# Patient Record
Sex: MALE | Race: WHITE | Employment: OTHER | URBAN - METROPOLITAN AREA
[De-identification: names, ages, dates, MRNs, and addresses within clinical notes are randomized per-mention and may not be internally consistent; named-entity substitution may affect disease eponyms.]

---

## 2018-03-02 ENCOUNTER — LAB REQUISITION (OUTPATIENT)
Dept: LAB | Facility: HOSPITAL | Age: 68
End: 2018-03-02
Payer: MEDICARE

## 2018-03-02 DIAGNOSIS — L72.3 SEBACEOUS CYST: ICD-10-CM

## 2018-03-02 PROCEDURE — 88304 TISSUE EXAM BY PATHOLOGIST: CPT

## 2018-03-02 PROCEDURE — 88305 TISSUE EXAM BY PATHOLOGIST: CPT

## 2018-07-25 ENCOUNTER — OFFICE VISIT (OUTPATIENT)
Dept: DERMATOLOGY CLINIC | Facility: CLINIC | Age: 68
End: 2018-07-25
Payer: MEDICARE

## 2018-07-25 DIAGNOSIS — D23.30 BENIGN NEOPLASM OF SKIN OF FACE: ICD-10-CM

## 2018-07-25 DIAGNOSIS — L82.1 SEBORRHEIC KERATOSES: ICD-10-CM

## 2018-07-25 DIAGNOSIS — D23.4 BENIGN NEOPLASM OF SCALP AND SKIN OF NECK: ICD-10-CM

## 2018-07-25 DIAGNOSIS — D23.70 BENIGN NEOPLASM OF SKIN OF LOWER LIMB, INCLUDING HIP, UNSPECIFIED LATERALITY: ICD-10-CM

## 2018-07-25 DIAGNOSIS — D23.5 BENIGN NEOPLASM OF SKIN OF TRUNK, EXCEPT SCROTUM: ICD-10-CM

## 2018-07-25 DIAGNOSIS — D23.60 BENIGN NEOPLASM OF SKIN OF UPPER LIMB, INCLUDING SHOULDER, UNSPECIFIED LATERALITY: ICD-10-CM

## 2018-07-25 DIAGNOSIS — L57.0 AK (ACTINIC KERATOSIS): Primary | ICD-10-CM

## 2018-07-25 PROCEDURE — 17003 DESTRUCT PREMALG LES 2-14: CPT | Performed by: DERMATOLOGY

## 2018-07-25 PROCEDURE — 99213 OFFICE O/P EST LOW 20 MIN: CPT | Performed by: DERMATOLOGY

## 2018-07-25 PROCEDURE — 17000 DESTRUCT PREMALG LESION: CPT | Performed by: DERMATOLOGY

## 2018-07-25 RX ORDER — METOPROLOL SUCCINATE 25 MG/1
12.5 TABLET, EXTENDED RELEASE ORAL
COMMUNITY
Start: 2017-10-03

## 2018-07-25 RX ORDER — VALSARTAN 40 MG/1
TABLET ORAL
COMMUNITY
Start: 2018-06-19 | End: 2018-07-25

## 2018-07-25 RX ORDER — BIOTIN 1 MG
1000 TABLET ORAL
COMMUNITY

## 2018-07-25 RX ORDER — VENLAFAXINE HYDROCHLORIDE 75 MG/1
CAPSULE, EXTENDED RELEASE ORAL
COMMUNITY
Start: 2018-06-19 | End: 2018-07-25

## 2018-08-06 NOTE — PROGRESS NOTES
Parker Culp is a 76year old male. HPI:     CC:  Patient presents with:  Lesion: LOV 10/7/2016. Pt presenting with leisons to scalp, face and bilateral arms. c/o scoreness and dryness to scalp and L cheek.  Pt has a personal hx of AKs that were previous Take 40 mg by mouth daily. Disp:  Rfl:    Omega-3 1000 MG Oral Cap Take  by mouth. Disp:  Rfl:    Multiple Vitamins-Minerals (CENTRUM SILVER ULTRA MENS) Oral Tab Take  by mouth.  Disp:  Rfl:    Venlafaxine HCl (EFFEXOR) 75 MG Oral Tab Take 75 mg by mouth 2 personal hx of AKs that were previously treated with cryotherapy. Pt has family hx of unknown type of skin cacner. Patient presents with concerns above. Patient has been in their usual state of health.   History, medications, allergies reviewed as keratoses    Actinic keratoses. Precancerous nature discussed. Lesions treated with cryo- . Biopsy if not resolved. lesions over the scalp right medial cheek, left lower cheek, right lower cheek, anterior scalp scattered small lesions over the scalp.

## 2019-02-02 ENCOUNTER — HOSPITAL ENCOUNTER (EMERGENCY)
Facility: HOSPITAL | Age: 69
Discharge: HOME | End: 2019-02-02
Attending: EMERGENCY MEDICINE
Payer: MEDICARE

## 2019-02-02 VITALS
TEMPERATURE: 98 F | HEIGHT: 71 IN | SYSTOLIC BLOOD PRESSURE: 159 MMHG | HEART RATE: 94 BPM | RESPIRATION RATE: 18 BRPM | DIASTOLIC BLOOD PRESSURE: 79 MMHG | OXYGEN SATURATION: 99 % | WEIGHT: 185 LBS | BODY MASS INDEX: 25.9 KG/M2

## 2019-02-02 DIAGNOSIS — S50.851A: Primary | ICD-10-CM

## 2019-02-02 PROCEDURE — 99283 EMERGENCY DEPT VISIT LOW MDM: CPT

## 2019-02-02 PROCEDURE — 10120 INC&RMVL FB SUBQ TISS SMPL: CPT

## 2019-02-02 RX ORDER — CEPHALEXIN 500 MG/1
500 CAPSULE ORAL 3 TIMES DAILY
Qty: 15 CAPSULE | Refills: 0 | Status: SHIPPED | OUTPATIENT
Start: 2019-02-02 | End: 2022-01-20 | Stop reason: ALTCHOICE

## 2019-02-03 NOTE — ED PROVIDER NOTES
Patient Seen in: [facility] AND [facility] Emergency Department    History   Patient presents with:  Foreign Body    Stated Complaint: \"1/2 inch sliver in right forearm\"    HPI    he presents because of foreign body to the right forearm.   He was working with h Social History    Tobacco Use      Smoking status: Former Smoker      Smokeless tobacco: Never Used    Alcohol use:  Yes      Alcohol/week: 1.2 oz      Types: 2 Cans of beer per week      Comment: socially    Drug use: No      Review of Systems  Const approximately 1 inch wooden splinter. No residual foreign body was felt or seen. He had bacitracin and bandage applied I discussed signs of infection I recommended follow-up with his doctor he states his wife is a nurse and can take good care of it.     E

## 2019-07-12 ENCOUNTER — OFFICE VISIT (OUTPATIENT)
Dept: DERMATOLOGY CLINIC | Facility: CLINIC | Age: 69
End: 2019-07-12
Payer: MEDICARE

## 2019-07-12 DIAGNOSIS — L82.1 SEBORRHEIC KERATOSES: ICD-10-CM

## 2019-07-12 DIAGNOSIS — D23.30 BENIGN NEOPLASM OF SKIN OF FACE: ICD-10-CM

## 2019-07-12 DIAGNOSIS — L57.0 AK (ACTINIC KERATOSIS): Primary | ICD-10-CM

## 2019-07-12 DIAGNOSIS — D23.60 BENIGN NEOPLASM OF SKIN OF UPPER LIMB, INCLUDING SHOULDER, UNSPECIFIED LATERALITY: ICD-10-CM

## 2019-07-12 DIAGNOSIS — D23.4 BENIGN NEOPLASM OF SCALP AND SKIN OF NECK: ICD-10-CM

## 2019-07-12 DIAGNOSIS — D23.70 BENIGN NEOPLASM OF SKIN OF LOWER LIMB, INCLUDING HIP, UNSPECIFIED LATERALITY: ICD-10-CM

## 2019-07-12 DIAGNOSIS — D23.5 BENIGN NEOPLASM OF SKIN OF TRUNK, EXCEPT SCROTUM: ICD-10-CM

## 2019-07-12 PROCEDURE — 99213 OFFICE O/P EST LOW 20 MIN: CPT | Performed by: DERMATOLOGY

## 2019-07-12 PROCEDURE — 17000 DESTRUCT PREMALG LESION: CPT | Performed by: DERMATOLOGY

## 2019-07-12 PROCEDURE — 17003 DESTRUCT PREMALG LES 2-14: CPT | Performed by: DERMATOLOGY

## 2019-07-12 PROCEDURE — G0463 HOSPITAL OUTPT CLINIC VISIT: HCPCS | Performed by: DERMATOLOGY

## 2019-07-12 RX ORDER — LOSARTAN POTASSIUM 25 MG/1
25 TABLET ORAL
COMMUNITY
Start: 2018-10-08

## 2019-07-22 NOTE — PROGRESS NOTES
Cora Cr is a 71year old male. HPI:     CC:  Patient presents with:  Actinic Keratosis: LOV 7/25/18. pt presenting today with f/u. pt concerned about new spots to bilateral cheeks. Denies personal HX of skin cancer.         Allergies:  Patient has n 100 MG Oral Cap Take  by mouth. Disp:  Rfl:    valsartan (DIOVAN) 80 MG Oral Tab Take 40 mg by mouth daily. Disp:  Rfl:    Omega-3 1000 MG Oral Cap Take  by mouth.  Disp:  Rfl:    Multiple Vitamins-Minerals (CENTRUM SILVER ULTRA MENS) Oral Tab Take  by mout together: Not on file        Attends Evangelical service: Not on file        Active member of club or organization: Not on file        Attends meetings of clubs or organizations: Not on file        Relationship status: Not on file      Intimate partner viole neck, face,nails, hair, external eyes, including conjunctival mucosa, eyelids, lips external ears, back, chest,/ breasts, axillae,  abdomen, arms, legs, palms.      Multiple light to medium brown, well marginated, uniformly pigmented, macules and papules 6 therapies, risks benefits discussed. Pathophysiology discussed with patient. Therapeutic options reviewed. See  Medications in grid. Instructions reviewed at length.     Benign nevi, seborrheic  keratoses, cherry angiomas:  Reassurance regarding other roverto

## 2020-08-07 ENCOUNTER — OFFICE VISIT (OUTPATIENT)
Dept: DERMATOLOGY CLINIC | Facility: CLINIC | Age: 70
End: 2020-08-07
Payer: MEDICARE

## 2020-08-07 DIAGNOSIS — D23.70 BENIGN NEOPLASM OF SKIN OF LOWER LIMB, INCLUDING HIP, UNSPECIFIED LATERALITY: ICD-10-CM

## 2020-08-07 DIAGNOSIS — D23.30 BENIGN NEOPLASM OF SKIN OF FACE: ICD-10-CM

## 2020-08-07 DIAGNOSIS — D23.4 BENIGN NEOPLASM OF SCALP AND SKIN OF NECK: ICD-10-CM

## 2020-08-07 DIAGNOSIS — D23.5 BENIGN NEOPLASM OF SKIN OF TRUNK, EXCEPT SCROTUM: ICD-10-CM

## 2020-08-07 DIAGNOSIS — L57.0 AK (ACTINIC KERATOSIS): Primary | ICD-10-CM

## 2020-08-07 DIAGNOSIS — L82.1 SEBORRHEIC KERATOSES: ICD-10-CM

## 2020-08-07 DIAGNOSIS — D23.60 BENIGN NEOPLASM OF SKIN OF UPPER LIMB, INCLUDING SHOULDER, UNSPECIFIED LATERALITY: ICD-10-CM

## 2020-08-07 PROCEDURE — 99213 OFFICE O/P EST LOW 20 MIN: CPT | Performed by: DERMATOLOGY

## 2020-08-07 PROCEDURE — 17003 DESTRUCT PREMALG LES 2-14: CPT | Performed by: DERMATOLOGY

## 2020-08-07 PROCEDURE — 17000 DESTRUCT PREMALG LESION: CPT | Performed by: DERMATOLOGY

## 2020-08-07 PROCEDURE — G0463 HOSPITAL OUTPT CLINIC VISIT: HCPCS | Performed by: DERMATOLOGY

## 2020-08-16 NOTE — PROGRESS NOTES
Nathan Ruiz is a 79year old male.   HPI:     CC:  Patient presents with:  Derm Problem: LOV 7/12/19, spot on left clavicle that is small, dark in color, right cheek redness has had for the past 2 weeks, nose right side dry area has had for the past 3 mo daily.     • Omega-3 1000 MG Oral Cap Take  by mouth. • Multiple Vitamins-Minerals (CENTRUM SILVER ULTRA MENS) Oral Tab Take  by mouth. • Venlafaxine HCl (EFFEXOR) 75 MG Oral Tab Take 75 mg by mouth 2 (two) times daily.      • cephALEXin 500 MG Oral connections:        Talks on phone: Not on file        Gets together: Not on file        Attends Hoahaoism service: Not on file        Active member of club or organization: Not on file        Attends meetings of clubs or organizations: Not on file noted.       Physical Examination:     Well-developed well-nourished patient alert oriented in no acute distress.   Exam total-body performed, including scalp, head, neck, face,nails, hair, external eyes, including conjunctival mucosa, eyelids, lips externa pruritus. Continue with moisturizers, sun protection    Scattered waxy tan keratotic papules reassurance very benign keratoses no other suspicious lesions noted      please refer to map for specific lesions. See additional diagnoses.   Pros cons of variou

## 2020-12-22 ENCOUNTER — IMMUNIZATION (OUTPATIENT)
Dept: LAB | Facility: HOSPITAL | Age: 70
End: 2020-12-22
Attending: PREVENTIVE MEDICINE
Payer: MEDICARE

## 2020-12-22 DIAGNOSIS — Z23 NEED FOR VACCINATION: ICD-10-CM

## 2020-12-22 PROCEDURE — 0001A SARSCOV2 VAC 30MCG/0.3ML IM: CPT

## 2021-01-12 ENCOUNTER — IMMUNIZATION (OUTPATIENT)
Dept: LAB | Facility: HOSPITAL | Age: 71
End: 2021-01-12
Attending: PREVENTIVE MEDICINE
Payer: MEDICARE

## 2021-01-12 DIAGNOSIS — Z23 NEED FOR VACCINATION: Primary | ICD-10-CM

## 2021-01-12 PROCEDURE — 0002A SARSCOV2 VAC 30MCG/0.3ML IM: CPT

## 2021-10-04 ENCOUNTER — IMMUNIZATION (OUTPATIENT)
Dept: LAB | Facility: HOSPITAL | Age: 71
End: 2021-10-04
Attending: EMERGENCY MEDICINE
Payer: MEDICARE

## 2021-10-04 ENCOUNTER — IMMUNIZATION (OUTPATIENT)
Dept: FAMILY MEDICINE CLINIC | Facility: CLINIC | Age: 71
End: 2021-10-04
Payer: MEDICARE

## 2021-10-04 DIAGNOSIS — Z23 INFLUENZA VACCINATION GIVEN: Primary | ICD-10-CM

## 2021-10-04 DIAGNOSIS — Z23 NEED FOR VACCINATION: Primary | ICD-10-CM

## 2021-10-04 PROCEDURE — 0003A SARSCOV2 VAC 30MCG/0.3ML IM: CPT

## 2021-10-04 PROCEDURE — G0008 ADMIN INFLUENZA VIRUS VAC: HCPCS | Performed by: PHYSICIAN ASSISTANT

## 2021-10-04 PROCEDURE — 90662 IIV NO PRSV INCREASED AG IM: CPT | Performed by: PHYSICIAN ASSISTANT

## 2022-01-14 ENCOUNTER — OFFICE VISIT (OUTPATIENT)
Dept: DERMATOLOGY CLINIC | Facility: CLINIC | Age: 72
End: 2022-01-14
Payer: MEDICARE

## 2022-01-14 DIAGNOSIS — L82.1 SEBORRHEIC KERATOSES: ICD-10-CM

## 2022-01-14 DIAGNOSIS — D23.30 BENIGN NEOPLASM OF SKIN OF FACE: ICD-10-CM

## 2022-01-14 DIAGNOSIS — L81.4 LENTIGO: ICD-10-CM

## 2022-01-14 DIAGNOSIS — L57.0 AK (ACTINIC KERATOSIS): Primary | ICD-10-CM

## 2022-01-14 DIAGNOSIS — D23.4 BENIGN NEOPLASM OF SCALP AND SKIN OF NECK: ICD-10-CM

## 2022-01-14 DIAGNOSIS — D23.60 BENIGN NEOPLASM OF SKIN OF UPPER LIMB, INCLUDING SHOULDER, UNSPECIFIED LATERALITY: ICD-10-CM

## 2022-01-14 DIAGNOSIS — D23.5 BENIGN NEOPLASM OF SKIN OF TRUNK, EXCEPT SCROTUM: ICD-10-CM

## 2022-01-14 PROCEDURE — 17003 DESTRUCT PREMALG LES 2-14: CPT | Performed by: DERMATOLOGY

## 2022-01-14 PROCEDURE — 17000 DESTRUCT PREMALG LESION: CPT | Performed by: DERMATOLOGY

## 2022-01-14 PROCEDURE — 99213 OFFICE O/P EST LOW 20 MIN: CPT | Performed by: DERMATOLOGY

## 2022-01-14 RX ORDER — VENLAFAXINE HYDROCHLORIDE 75 MG/1
CAPSULE, EXTENDED RELEASE ORAL
COMMUNITY
Start: 2021-11-19

## 2022-01-14 RX ORDER — FLUOROURACIL 50 MG/G
CREAM TOPICAL
Qty: 40 G | Refills: 1 | Status: SHIPPED | OUTPATIENT
Start: 2022-01-14

## 2022-01-15 NOTE — PROGRESS NOTES
Zoe Ellis is a 70year old male. HPI:     CC:  Patient presents with:  Upper Body Exam: LOV 8/7/20-Pt. with Hx of AK's. Pt. present for Upper Body Skin Exam. Pt. present with c/o for dry and flakey scalp, which he admits to picking at.  Redness on R c (OMEGA-3 DHA) Does not apply Powder Take by mouth. • Atorvastatin Calcium 10 MG Oral Tab      • Glucosamine Sulfate 1000 MG Oral Tab Take  by mouth. • Coenzyme Q10 100 MG Oral Cap Take  by mouth.      • valsartan (DIOVAN) 80 MG Oral Tab Take 40 mg b Other Topics      Concerns:        Grew up on a farm: Not Asked        History of tanning: Not Asked        Outdoor occupation: Not Asked        Pt has a pacemaker: No        Pt has a defibrillator: No        Reaction to local anesthetic: No    Social Hist History, medications, allergies reviewed as noted. Physical Examination:     Well-developed well-nourished patient alert oriented in no acute distress.   Exam total-body performed, including scalp, head, neck, face,nails, hair, external eyes, includin crusting irritation pain tenderness potential discussed. Anticipate one month for resolution of symptoms. Plan recheck in one month after completion of treatment course. Consider alternative treatment biopsy if not resolved.   Continue careful sun protec

## 2022-01-19 ENCOUNTER — TELEMEDICINE (OUTPATIENT)
Dept: TELEHEALTH | Age: 72
End: 2022-01-19

## 2022-01-19 DIAGNOSIS — Z02.9 ENCOUNTER FOR ADMINISTRATIVE EXAMINATIONS, UNSPECIFIED: Primary | ICD-10-CM

## 2022-01-19 NOTE — PROGRESS NOTES
Video visit created in error. Gave patient number or central scheduling. Pt denies any ear pain or ear infection. No charge.

## 2022-01-20 ENCOUNTER — OFFICE VISIT (OUTPATIENT)
Dept: AUDIOLOGY | Facility: CLINIC | Age: 72
End: 2022-01-20
Payer: MEDICARE

## 2022-01-20 ENCOUNTER — OFFICE VISIT (OUTPATIENT)
Dept: OTOLARYNGOLOGY | Facility: CLINIC | Age: 72
End: 2022-01-20
Payer: MEDICARE

## 2022-01-20 VITALS — BODY MASS INDEX: 25.9 KG/M2 | HEIGHT: 71 IN | RESPIRATION RATE: 16 BRPM | WEIGHT: 185 LBS

## 2022-01-20 DIAGNOSIS — H83.3X3 NOISE-INDUCED HEARING LOSS OF BOTH EARS: Primary | ICD-10-CM

## 2022-01-20 DIAGNOSIS — H90.3 SENSORINEURAL HEARING LOSS, BILATERAL: Primary | ICD-10-CM

## 2022-01-20 PROCEDURE — 92567 TYMPANOMETRY: CPT | Performed by: AUDIOLOGIST

## 2022-01-20 PROCEDURE — 99203 OFFICE O/P NEW LOW 30 MIN: CPT | Performed by: OTOLARYNGOLOGY

## 2022-01-20 PROCEDURE — 92557 COMPREHENSIVE HEARING TEST: CPT | Performed by: AUDIOLOGIST

## 2022-01-20 NOTE — PROGRESS NOTES
Carmelina Nava is a 70year old male. Patient presents with:  Establish Care: Patient is hearing loss over the years.  Patient is Former  and He thinks it contributes to hearing loss         HISTORY OF PRESENT ILLNESS  1/20/2022   Here for evaluation Date   • Arthritis    • Essential hypertension    • High cholesterol    • Hyperlipidemia    • Sleep apnea      Past Surgical History:   Procedure Laterality Date   • COLONOSCOPY     • HERNIA SURGERY      left inguinal hernia repair x2   • INGUINAL HERNIA R loss consistent with his noise exposure          Current Outpatient Medications:   •  venlafaxine 75 MG Oral Capsule SR 24 Hr, , Disp: , Rfl:   •  fluorouracil 5 % External Cream, Use twice weekly at night to scalp and right cheek and ears as directed x 6 dictation, discrepancies may still exist  Timothy Murray MD 1/20/2022 12:17 PM

## 2022-01-20 NOTE — PATIENT INSTRUCTIONS
How Hearing Aids Can Help You    Losing your hearing can be frustrating. But hearing aids can help you hear what [place] been missing. Not everyone who has hearing loss needs hearing aids.  Hearing aids will most likely help you if your hearing loss:   · The [facility] 4037. All rights reserved. This information is not intended as a substitute for professional medical care. Always follow your healthcare professional's instructions.

## 2022-05-25 ENCOUNTER — TELEPHONE (OUTPATIENT)
Dept: DERMATOLOGY CLINIC | Facility: CLINIC | Age: 72
End: 2022-05-25

## 2022-05-25 NOTE — TELEPHONE ENCOUNTER
Patient would like if possible provider could see him  for his head treatment for follow up.  Patient would like to know if possible could come with wife has appointment on 6/29/22 at 6:45pm.

## 2023-03-14 ENCOUNTER — OFFICE VISIT (OUTPATIENT)
Dept: AUDIOLOGY | Facility: CLINIC | Age: 73
End: 2023-03-14

## 2023-03-14 ENCOUNTER — OFFICE VISIT (OUTPATIENT)
Dept: OTOLARYNGOLOGY | Facility: CLINIC | Age: 73
End: 2023-03-14

## 2023-03-14 VITALS — TEMPERATURE: 99 F

## 2023-03-14 DIAGNOSIS — H91.8X9 ASYMMETRICAL HEARING LOSS: ICD-10-CM

## 2023-03-14 DIAGNOSIS — H83.3X3 NOISE-INDUCED HEARING LOSS OF BOTH EARS: Primary | ICD-10-CM

## 2023-03-14 DIAGNOSIS — H90.3 SENSORINEURAL HEARING LOSS, BILATERAL: Primary | ICD-10-CM

## 2023-03-14 PROCEDURE — 92567 TYMPANOMETRY: CPT | Performed by: AUDIOLOGIST

## 2023-03-14 PROCEDURE — 92557 COMPREHENSIVE HEARING TEST: CPT | Performed by: AUDIOLOGIST

## 2023-03-14 PROCEDURE — 99213 OFFICE O/P EST LOW 20 MIN: CPT | Performed by: OTOLARYNGOLOGY

## 2023-10-12 ENCOUNTER — OFFICE VISIT (OUTPATIENT)
Dept: DERMATOLOGY CLINIC | Facility: CLINIC | Age: 73
End: 2023-10-12

## 2023-10-12 DIAGNOSIS — D23.4 BENIGN NEOPLASM OF SCALP AND SKIN OF NECK: ICD-10-CM

## 2023-10-12 DIAGNOSIS — D23.30 BENIGN NEOPLASM OF SKIN OF FACE: ICD-10-CM

## 2023-10-12 DIAGNOSIS — D22.9 MULTIPLE NEVI: ICD-10-CM

## 2023-10-12 DIAGNOSIS — L57.0 AK (ACTINIC KERATOSIS): Primary | ICD-10-CM

## 2023-10-12 DIAGNOSIS — L82.1 SEBORRHEIC KERATOSES: ICD-10-CM

## 2023-10-12 DIAGNOSIS — L81.4 LENTIGO: ICD-10-CM

## 2023-10-12 DIAGNOSIS — D23.60 BENIGN NEOPLASM OF SKIN OF UPPER LIMB, INCLUDING SHOULDER, UNSPECIFIED LATERALITY: ICD-10-CM

## 2023-10-12 PROCEDURE — 17003 DESTRUCT PREMALG LES 2-14: CPT | Performed by: DERMATOLOGY

## 2023-10-12 PROCEDURE — 99213 OFFICE O/P EST LOW 20 MIN: CPT | Performed by: DERMATOLOGY

## 2023-10-12 PROCEDURE — 17000 DESTRUCT PREMALG LESION: CPT | Performed by: DERMATOLOGY

## 2023-10-23 NOTE — PROGRESS NOTES
Jeanette Wong is a 68year old male. HPI:     CC:  Patient presents with:  Actinic Keratosis: LOV 1/14/22. Patient present for recurring spot on his nose. Tried applying fluorouracil 5% cream with minimal improvement. Patient also applied moisturizer to keep the lesion soft. Denies itching. Denies pain. Notes, the lesions on his scalp are gone. Allergies:  Patient has no known allergies. HISTORY:    Past Medical History:   Diagnosis Date    Arthritis     Essential hypertension     High cholesterol     Hyperlipidemia     Sleep apnea       Past Surgical History:   Procedure Laterality Date    COLONOSCOPY      HERNIA SURGERY      left inguinal hernia repair x2    INGUINAL HERNIA REPAIR Right 04/12/16    With insertion of an extra large PreFix Marlex plug and mesh and excision of lipoma of the cord. OTHER SURGICAL HISTORY Right     HAND SURGERY    TONSILLECTOMY      UMBILICAL HERNIA REPAIR  04/12/16    with mesh      Family History   Problem Relation Age of Onset    Cancer Father         Skin    Prostate Cancer Father     Cancer Mother         Breast    Breast Cancer Mother       Social History     Socioeconomic History    Marital status:     Number of children: 3   Occupational History    Occupation: retired   Tobacco Use    Smoking status: Former    Smokeless tobacco: Never   Substance and Sexual Activity    Alcohol use: Yes     Alcohol/week: 2.0 standard drinks of alcohol     Types: 2 Cans of beer per week     Comment: socially    Drug use: No   Other Topics Concern    Pt has a pacemaker No    Pt has a defibrillator No    Reaction to local anesthetic No        Current Outpatient Medications   Medication Sig Dispense Refill    venlafaxine 75 MG Oral Capsule SR 24 Hr       fluorouracil 5 % External Cream Use twice weekly at night to scalp and right cheek and ears as directed x 6 weeks 40 g 1    Losartan Potassium 25 MG Oral Tab Take 1 tablet (25 mg total) by mouth.       CALCIUM OR 1 tab daily Cholecalciferol (VITAMIN D3) 1000 units Oral Cap Take 1,000 Units by mouth. Metoprolol Succinate ER 25 MG Oral Tablet 24 Hr Take 0.5 tablets (12.5 mg total) by mouth. Docosahexaenoic Acid (OMEGA-3 DHA) Does not apply Powder Take by mouth. Atorvastatin Calcium 10 MG Oral Tab       aspirin 81 MG Oral Tab Take by mouth. Glucosamine Sulfate 1000 MG Oral Tab Take  by mouth. Coenzyme Q10 100 MG Oral Cap Take  by mouth.      valsartan (DIOVAN) 80 MG Oral Tab Take 0.5 tablets (40 mg total) by mouth daily. Omega-3 1000 MG Oral Cap Take  by mouth. Multiple Vitamins-Minerals (CENTRUM SILVER ULTRA MENS) Oral Tab Take by mouth. Venlafaxine HCl (EFFEXOR) 75 MG Oral Tab Take 1 tablet (75 mg total) by mouth 2 (two) times daily. Allergies:   No Known Allergies    Past Medical History:   Diagnosis Date    Arthritis     Essential hypertension     High cholesterol     Hyperlipidemia     Sleep apnea      Past Surgical History:   Procedure Laterality Date    COLONOSCOPY      HERNIA SURGERY      left inguinal hernia repair x2    INGUINAL HERNIA REPAIR Right 04/12/16    With insertion of an extra large PreFix Marlex plug and mesh and excision of lipoma of the cord. OTHER SURGICAL HISTORY Right     HAND SURGERY    TONSILLECTOMY      UMBILICAL HERNIA REPAIR  04/12/16    with mesh     Social History    Socioeconomic History      Marital status:       Spouse name: Not on file      Number of children: 3      Years of education: Not on file      Highest education level: Not on file    Occupational History      Occupation: retired    Tobacco Use      Smoking status: Former      Smokeless tobacco: Never    Substance and Sexual Activity      Alcohol use:  Yes        Alcohol/week: 2.0 standard drinks of alcohol        Types: 2 Cans of beer per week        Comment: socially      Drug use: No      Sexual activity: Not on file    Other Topics      Concerns:        Grew up on a farm: Not Asked History of tanning: Not Asked        Outdoor occupation: Not Asked        Pt has a pacemaker: No        Pt has a defibrillator: No        Reaction to local anesthetic: No    Social History Narrative      Not on file    Social Determinants of Health  Financial Resource Strain: Not on file  Food Insecurity: Not on file  Transportation Needs: Not on file  Physical Activity: Not on file  Stress: Not on file  Social Connections: Not on file  Housing Stability: Not on file  Family History   Problem Relation Age of Onset    Cancer Father         Skin    Prostate Cancer Father     Cancer Mother         Breast    Breast Cancer Mother        There were no vitals filed for this visit. HPI:    Patient presents with:  Actinic Keratosis: LOV 1/14/22. Patient present for recurring spot on his nose. Tried applying fluorouracil 5% cream with minimal improvement. Patient also applied moisturizer to keep the lesion soft. Denies itching. Denies pain. Notes, the lesions on his scalp are gone. Past notes/ records and appropriate/relevant lab results including pathology and past body maps reviewed. Updated and new information noted in current visit. History of AK's follow-up. Patient has been more careful use sunscreen and hat. Still outdoors a great deal     concerned lesions on scalp, right mid cheek left jawline  Left chest lesion, left flank lesion      Patient presents with concerns above. Patient has been in their usual state of health. History, medications, allergies reviewed as noted. ROS:  Denies any other systemic complaints. No new or changeing lesions other than noted above. No fevers, chills, night sweats, unusual sun sensitivity. No other skin complaints. History, medications, allergies reviewed as noted. Physical Examination:     Well-developed well-nourished patient alert oriented in no acute distress.   Exam total-body performed, including scalp, head, neck, face,nails, hair, external eyes, including conjunctival mucosa, eyelids, lips external ears, back, chest,/ breasts, axillae,  abdomen, arms, legs, palms. Multiple light to medium brown, well marginated, uniformly pigmented, macules and papules 6 mm and less scattered on exam. pigmented lesions examined with dermoscopy benign-appearing patterns. Waxy tannish keratotic papules scattered, cherry-red vascular papules scattered. See map today's date for lesions noted . Erythematous scaling keratotic papules see below     otherwise remarkable for lesions as noted on map. See Assessment /Plan for additional history and physical exam also:    Assessment / plan:    No orders of the defined types were placed in this encounter. Meds & Refills for this Visit:  Requested Prescriptions      No prescriptions requested or ordered in this encounter         Ak (actinic keratosis)  (primary encounter diagnosis)  Seborrheic keratoses  Lentigo  Multiple nevi  Benign neoplasm of skin of upper limb, including shoulder, unspecified laterality  Benign neoplasm of scalp and skin of neck  Benign neoplasm of skin of face    Lesion on nasal dorsum AK continue fluorouracil    Erythematous scaling keratotic papules noted at sites noted on map  Actinic Keratoses. Precancerous nature discussed. Sun protection, sunscreen/ blocks encouraged Lesions treated with cryo- . Biopsy if not resolved. Nasal dorsum treat with cryo we will follow-up with fluorouracil as above, lesions at vertex, left helical rim, right temple X3    Multiple benign keratoses in the background upper lip, right cheek scattered lesions on the chest  Waxy tan keratotic papules lesions in areas of concern as noted reassurance given. Benign nature discussed. Possibility of cryo, alphahydroxy acids over-the-counter retinol's discussed.   Continue careful sun protection  History of verrucoid keratoses stable    Area of chronic pruritus burning better presently stable last year had been more inflamedpossible brachial radial pruritus. Continue with moisturizers, sun protection    Scattered waxy tan keratotic papules reassurance very benign keratoses no other suspicious lesions noted      please refer to map for specific lesions. See additional diagnoses. Pros cons of various therapies, risks benefits discussed. Pathophysiology discussed with patient. Therapeutic options reviewed. See  Medications in grid. Instructions reviewed at length. Benign nevi, seborrheic  keratoses, cherry angiomas:  Reassurance regarding other benign skin lesions. Signs and symptoms of skin cancer, ABCDE's of melanoma discussed with patient. Sunscreen use, sun protection, self exams reviewed. Sunscreen sun protective clothing discussed. At least SPF 48, hat followup as noted RTC routine checkup 6 mos - one year or p.r.n. The patient indicates understanding of these issues and agrees to the plan. The patient is asked to return as noted in follow-up/ above. This note was generated using Dragon voice recognition software. Please contact me regarding any confusion resulting from errors in recognition.

## 2024-05-04 ENCOUNTER — HOSPITAL ENCOUNTER (OUTPATIENT)
Dept: ULTRASOUND IMAGING | Facility: HOSPITAL | Age: 74
Discharge: HOME | End: 2024-05-04
Attending: INTERNAL MEDICINE
Payer: MEDICARE

## 2024-05-04 DIAGNOSIS — Z13.6 SCREENING FOR CARDIOVASCULAR CONDITION: ICD-10-CM

## 2024-05-04 PROCEDURE — 76706 US ABDL AORTA SCREEN AAA: CPT | Performed by: INTERNAL MEDICINE

## 2024-05-29 ENCOUNTER — OFFICE VISIT (OUTPATIENT)
Dept: DERMATOLOGY CLINIC | Facility: CLINIC | Age: 74
End: 2024-05-29
Payer: MEDICARE

## 2024-05-29 DIAGNOSIS — L30.8 PSORIASIFORM DERMATITIS: ICD-10-CM

## 2024-05-29 DIAGNOSIS — L57.0 AK (ACTINIC KERATOSIS): Primary | ICD-10-CM

## 2024-05-29 DIAGNOSIS — L81.4 LENTIGO: ICD-10-CM

## 2024-05-29 PROCEDURE — 99213 OFFICE O/P EST LOW 20 MIN: CPT | Performed by: DERMATOLOGY

## 2024-05-29 PROCEDURE — 17000 DESTRUCT PREMALG LESION: CPT | Performed by: DERMATOLOGY

## 2024-05-29 RX ORDER — MOMETASONE FUROATE 1 MG/G
CREAM TOPICAL
Qty: 45 G | Refills: 1 | Status: SHIPPED | OUTPATIENT
Start: 2024-05-29

## 2024-05-29 RX ORDER — LOSARTAN POTASSIUM 50 MG/1
TABLET ORAL
COMMUNITY
Start: 2024-05-16

## 2024-05-29 RX ORDER — PROPRANOLOL HYDROCHLORIDE 80 MG/1
80 CAPSULE, EXTENDED RELEASE ORAL DAILY
COMMUNITY
Start: 2024-03-21

## 2024-05-30 NOTE — PROGRESS NOTES
Baldev Vieyra is a 73 year old male.  HPI:     CC:    Chief Complaint   Patient presents with    Rash     LOV 10/2023.  Pt presents for rash of concern to the lower back for about 1 month, red and occasion itchiness.  Has applied hydrocortisone which helps but now spreading to buttocks.          Allergies:  Patient has no known allergies.    HISTORY:    Past Medical History:    Arthritis    Essential hypertension    High cholesterol    Hyperlipidemia    Sleep apnea      Past Surgical History:   Procedure Laterality Date    Colonoscopy      Hernia surgery      left inguinal hernia repair x2    Inguinal hernia repair Right 04/12/16    With insertion of an extra large PreFix Marlex plug and mesh and excision of lipoma of the cord.    Other surgical history Right     HAND SURGERY    Tonsillectomy      Umbilical hernia repair  04/12/16    with mesh      Family History   Problem Relation Age of Onset    Cancer Father         Skin    Prostate Cancer Father     Cancer Mother         Breast    Breast Cancer Mother       Social History     Socioeconomic History    Marital status:     Number of children: 3   Occupational History    Occupation: retired   Tobacco Use    Smoking status: Former    Smokeless tobacco: Never   Substance and Sexual Activity    Alcohol use: Yes     Alcohol/week: 2.0 standard drinks of alcohol     Types: 2 Cans of beer per week     Comment: socially    Drug use: No   Other Topics Concern    Grew up on a farm No    History of tanning Yes    Outdoor occupation No    Pt has a pacemaker No    Pt has a defibrillator No    Reaction to local anesthetic No     Social Determinants of Health     Financial Resource Strain: Low Risk  (4/11/2024)    Received from [facility]    Overall Financial Resource Strain (CARDIA)     Difficulty of Paying Living Expenses: Not hard at all   Food Insecurity: No Food Insecurity (4/11/2024)    Received from [facility]    Hunger  Vital Sign     Worried About Running Out of Food in the Last Year: Never true     Ran Out of Food in the Last Year: Never true   Transportation Needs: No Transportation Needs (4/11/2024)    Received from [facility]    PRAPARE - Transportation     Lack of Transportation (Medical): No     Lack of Transportation (Non-Medical): No   Housing Stability: Unknown (4/11/2024)    Received from [facility]    Housing Stability Vital Sign     Unable to Pay for Housing in the Last Year: No     In the last 12 months, was there a time when you did not have a steady place to sleep or slept in a shelter (including now)?: No        Current Outpatient Medications   Medication Sig Dispense Refill    Propranolol HCl ER 80 MG Oral Capsule SR 24 Hr Take 1 capsule (80 mg total) by mouth daily.      losartan 50 MG Oral Tab       Mometasone Furoate 0.1 % External Cream Apply a thin film to the affected area bid as directed 45 g 1    venlafaxine 75 MG Oral Capsule SR 24 Hr       fluorouracil 5 % External Cream Use twice weekly at night to scalp and right cheek and ears as directed x 6 weeks 40 g 1    CALCIUM OR 1 tab daily      Cholecalciferol (VITAMIN D3) 1000 units Oral Cap Take 1,000 Units by mouth.      Metoprolol Succinate ER 25 MG Oral Tablet 24 Hr Take 0.5 tablets (12.5 mg total) by mouth.      Docosahexaenoic Acid (OMEGA-3 DHA) Does not apply Powder Take by mouth.      Atorvastatin Calcium 10 MG Oral Tab       aspirin 81 MG Oral Tab Take by mouth.      Glucosamine Sulfate 1000 MG Oral Tab Take  by mouth.      Coenzyme Q10 100 MG Oral Cap Take  by mouth.      valsartan (DIOVAN) 80 MG Oral Tab Take 0.5 tablets (40 mg total) by mouth daily.      Omega-3 1000 MG Oral Cap Take  by mouth.      Multiple Vitamins-Minerals (CENTRUM SILVER ULTRA MENS) Oral Tab Take by mouth.      Venlafaxine HCl (EFFEXOR) 75 MG Oral Tab Take 1 tablet (75 mg total) by mouth 2 (two) times daily.       Allergies:   No  Known Allergies    Past Medical History:    Arthritis    Essential hypertension    High cholesterol    Hyperlipidemia    Sleep apnea     Past Surgical History:   Procedure Laterality Date    Colonoscopy      Hernia surgery      left inguinal hernia repair x2    Inguinal hernia repair Right 04/12/16    With insertion of an extra large PreFix Marlex plug and mesh and excision of lipoma of the cord.    Other surgical history Right     HAND SURGERY    Tonsillectomy      Umbilical hernia repair  04/12/16    with mesh     Social History     Socioeconomic History    Marital status:      Spouse name: Not on file    Number of children: 3    Years of education: Not on file    Highest education level: Not on file   Occupational History    Occupation: retired   Tobacco Use    Smoking status: Former    Smokeless tobacco: Never   Substance and Sexual Activity    Alcohol use: Yes     Alcohol/week: 2.0 standard drinks of alcohol     Types: 2 Cans of beer per week     Comment: socially    Drug use: No    Sexual activity: Not on file   Other Topics Concern    Grew up on a farm No    History of tanning Yes    Outdoor occupation No    Pt has a pacemaker No    Pt has a defibrillator No    Reaction to local anesthetic No   Social History Narrative    Not on file     Social Determinants of Health     Financial Resource Strain: Low Risk  (4/11/2024)    Received from [facility]    Overall Financial Resource Strain (CARDIA)     Difficulty of Paying Living Expenses: Not hard at all   Food Insecurity: No Food Insecurity (4/11/2024)    Received from [facility]    Hunger Vital Sign     Worried About Running Out of Food in the Last Year: Never true     Ran Out of Food in the Last Year: Never true   Transportation Needs: No Transportation Needs (4/11/2024)    Received from [facility]    PRAPARE - Transportation     Lack of Transportation (Medical): No     Lack of  Transportation (Non-Medical): No   Physical Activity: Not on file   Stress: Not on file   Social Connections: Not on file   Housing Stability: Unknown (4/11/2024)    Received from [facility]    Housing Stability Vital Sign     Unable to Pay for Housing in the Last Year: No     Number of Places Lived in the Last Year: Not on file     In the last 12 months, was there a time when you did not have a steady place to sleep or slept in a shelter (including now)?: No     Family History   Problem Relation Age of Onset    Cancer Father         Skin    Prostate Cancer Father     Cancer Mother         Breast    Breast Cancer Mother        There were no vitals filed for this visit.    HPI:    Chief Complaint   Patient presents with    Rash     LOV 10/2023.  Pt presents for rash of concern to the lower back for about 1 month, red and occasion itchiness.  Has applied hydrocortisone which helps but now spreading to buttocks.        Past notes/ records and appropriate/relevant lab results including pathology and past body maps reviewed. Updated and new information noted in current visit.     History of AK's follow-up.  Patient has been more careful use sunscreen and hat.  Still outdoors a great deal      Patient presents with concerns above.    Patient has been in their usual state of health.  History, medications, allergies reviewed as noted.      ROS:  Denies any other systemic complaints.  No new or changeing lesions other than noted above. No fevers, chills, night sweats, unusual sun sensitivity.  No other skin complaints.        History, medications, allergies reviewed as noted.       Physical Examination:     Well-developed well-nourished patient alert oriented in no acute distress.  Exam total-body performed, including scalp, head, neck, face,nails, hair, external eyes, including conjunctival mucosa, eyelids, lips external ears, back, chest,/ breasts, axillae,  abdomen, arms, legs, palms.     Multiple  light to medium brown, well marginated, uniformly pigmented, macules and papules 6 mm and less scattered on exam. pigmented lesions examined with dermoscopy benign-appearing patterns.     Waxy tannish keratotic papules scattered, cherry-red vascular papules scattered.    See map today's date for lesions noted .  Erythematous scaling keratotic papules see below     otherwise remarkable for lesions as noted on map.  See Assessment /Plan for additional history and physical exam also:    Assessment / plan:    No orders of the defined types were placed in this encounter.      Meds & Refills for this Visit:  Requested Prescriptions     Signed Prescriptions Disp Refills    Mometasone Furoate 0.1 % External Cream 45 g 1     Sig: Apply a thin film to the affected area bid as directed         Encounter Diagnoses   Name Primary?    AK (actinic keratosis) Yes    Psoriasiform dermatitis     Lentigo        AK's continue fluorouracil as needed cheeks in particular    Erythematous scaling keratotic papules noted at sites noted on map  Actinic Keratoses.  Precancerous nature discussed. Sun protection, sunscreen/ blocks encouraged Lesions treated with cryo- .  Biopsy if not resolved.    Nasal dorsum    Erruption of concern psoriasiform changes over the sacrum, intergluteal cleft recommend combination of mometasone, and antifungal clotrimazole based cream twice daily  Psoriasiform dermatitis.Will treat with medications and grid.  Overall skincare, liberal use of emollients discussed.  Consider more aggressive therapy if worsening.Patient will let us know how they are doing over the next several weeks.  Await clinical response to above therapy.  Recheck in 2-3 months if no improvement.    Multiple benign keratoses in the background upper lip, right cheek scattered lesions on the chest  Waxy tan keratotic papules lesions in areas of concern as noted reassurance given.  Benign nature discussed.  Possibility of cryo, alphahydroxy acids  over-the-counter retinol's discussed.  Continue careful sun protection  History of verrucoid keratoses stable    Area of chronic pruritus burning better presently stable last year had been more inflamedpossible brachial radial pruritus.  Continue with moisturizers, sun protection    Scattered waxy tan keratotic papules reassurance very benign keratoses no other suspicious lesions noted      please refer to map for specific lesions.  See additional diagnoses.  Pros cons of various therapies, risks benefits discussed.Pathophysiology discussed with patient.  Therapeutic options reviewed.  See  Medications in grid.  Instructions reviewed at length.    Benign nevi, seborrheic  keratoses, cherry angiomas:  Reassurance regarding other benign skin lesions.Signs and symptoms of skin cancer, ABCDE's of melanoma discussed with patient. Sunscreen use, sun protection, self exams reviewed.  Sunscreen sun protective clothing discussed.  At least SPF 50, hat followup as noted RTC routine checkup 6 mos - one year or p.r.n.      The patient indicates understanding of these issues and agrees to the plan.  The patient is asked to return as noted in follow-up/ above.    This note was generated using Dragon voice recognition software.  Please contact me regarding any confusion resulting from errors in recognition.

## 2024-12-23 ENCOUNTER — OFFICE VISIT (OUTPATIENT)
Dept: DERMATOLOGY CLINIC | Facility: CLINIC | Age: 74
End: 2024-12-23
Payer: MEDICARE

## 2024-12-23 DIAGNOSIS — L82.1 SEBORRHEIC KERATOSES: ICD-10-CM

## 2024-12-23 DIAGNOSIS — Z51.81 MEDICATION MONITORING ENCOUNTER: ICD-10-CM

## 2024-12-23 DIAGNOSIS — L57.0 AK (ACTINIC KERATOSIS): Primary | ICD-10-CM

## 2024-12-23 DIAGNOSIS — L81.4 LENTIGO: ICD-10-CM

## 2024-12-23 DIAGNOSIS — D22.9 MULTIPLE NEVI: ICD-10-CM

## 2024-12-23 DIAGNOSIS — L30.8 PSORIASIFORM DERMATITIS: ICD-10-CM

## 2024-12-23 PROCEDURE — 99213 OFFICE O/P EST LOW 20 MIN: CPT | Performed by: DERMATOLOGY

## 2024-12-23 PROCEDURE — G2211 COMPLEX E/M VISIT ADD ON: HCPCS | Performed by: DERMATOLOGY

## 2024-12-23 RX ORDER — CHLORHEXIDINE GLUCONATE ORAL RINSE 1.2 MG/ML
SOLUTION DENTAL
COMMUNITY
Start: 2024-12-13

## 2024-12-23 RX ORDER — BETAMETHASONE DIPROPIONATE 0.5 MG/G
LOTION TOPICAL
Qty: 60 ML | Refills: 5 | Status: SHIPPED | OUTPATIENT
Start: 2024-12-23

## 2025-01-01 NOTE — PROGRESS NOTES
Baldev Vieyra is a 74 year old male.  HPI:     CC:    Chief Complaint   Patient presents with    Full Skin Exam     LOV 5/29/24. Pt presents for skin check. Has personal Hx of Ak's. Has a few new dry spots on back of scalp, and on chest. Denies personal Hx of skin cancer. Has questions regarding Rx fluorouracil 5 % External Cream. Pt is unsure about usage. Has family Hx of unspecified skin cancer(Father).         Allergies:  Patient has no known allergies.    HISTORY:    Past Medical History:    Arthritis    Essential hypertension    High cholesterol    Hyperlipidemia    Sleep apnea      Past Surgical History:   Procedure Laterality Date    Colonoscopy      Hernia surgery      left inguinal hernia repair x2    Inguinal hernia repair Right 04/12/16    With insertion of an extra large PreFix Marlex plug and mesh and excision of lipoma of the cord.    Other surgical history Right     HAND SURGERY    Tonsillectomy      Umbilical hernia repair  04/12/16    with mesh      Family History   Problem Relation Age of Onset    Cancer Father         Skin    Prostate Cancer Father     Cancer Mother         Breast    Breast Cancer Mother       Social History     Socioeconomic History    Marital status:     Number of children: 3   Occupational History    Occupation: retired   Tobacco Use    Smoking status: Former    Smokeless tobacco: Never   Substance and Sexual Activity    Alcohol use: Yes     Alcohol/week: 2.0 standard drinks of alcohol     Types: 2 Cans of beer per week     Comment: socially    Drug use: No   Other Topics Concern    Grew up on a farm No    History of tanning Yes    Outdoor occupation No    Pt has a pacemaker No    Pt has a defibrillator No    Reaction to local anesthetic No     Social Drivers of Health     Financial Resource Strain: Low Risk  (4/11/2024)    Received from [facility]    Overall Financial Resource Strain (CARDIA)     Difficulty of Paying Living Expenses: Not hard at  all   Food Insecurity: No Food Insecurity (4/11/2024)    Received from [facility]    Hunger Vital Sign     Worried About Running Out of Food in the Last Year: Never true     Ran Out of Food in the Last Year: Never true   Transportation Needs: No Transportation Needs (4/11/2024)    Received from [facility]    PRAPARE - Transportation     Lack of Transportation (Medical): No     Lack of Transportation (Non-Medical): No   Housing Stability: Unknown (4/11/2024)    Received from [facility]    Housing Stability Vital Sign     Unable to Pay for Housing in the Last Year: No     Unstable Housing in the Last Year: No        Current Outpatient Medications   Medication Sig Dispense Refill    chlorhexidine gluconate 0.12 % Mouth/Throat Solution       betamethasone dipropionate 0.05 % External Lotion To area of psoriasis on scalp use bid as directed 60 mL 5    Propranolol HCl ER 80 MG Oral Capsule SR 24 Hr Take 1 capsule (80 mg total) by mouth daily.      losartan 50 MG Oral Tab       CALCIUM OR 1 tab daily      Cholecalciferol (VITAMIN D3) 1000 units Oral Cap Take 1,000 Units by mouth.      Docosahexaenoic Acid (OMEGA-3 DHA) Does not apply Powder Take by mouth.      Atorvastatin Calcium 10 MG Oral Tab       Glucosamine Sulfate 1000 MG Oral Tab Take  by mouth.      Coenzyme Q10 100 MG Oral Cap Take  by mouth.      valsartan (DIOVAN) 80 MG Oral Tab Take 0.5 tablets (40 mg total) by mouth daily.      Omega-3 1000 MG Oral Cap Take  by mouth.      Venlafaxine HCl (EFFEXOR) 75 MG Oral Tab Take 1 tablet (75 mg total) by mouth 2 (two) times daily.      Mometasone Furoate 0.1 % External Cream Apply a thin film to the affected area bid as directed (Patient not taking: Reported on 12/23/2024) 45 g 1    venlafaxine 75 MG Oral Capsule SR 24 Hr       fluorouracil 5 % External Cream Use twice weekly at night to scalp and right cheek and ears as directed x 6 weeks (Patient not  taking: Reported on 12/23/2024) 40 g 1    Metoprolol Succinate ER 25 MG Oral Tablet 24 Hr Take 0.5 tablets (12.5 mg total) by mouth.      aspirin 81 MG Oral Tab Take by mouth.      Multiple Vitamins-Minerals (CENTRUM SILVER ULTRA MENS) Oral Tab Take by mouth.       Allergies:   No Known Allergies    Past Medical History:    Arthritis    Essential hypertension    High cholesterol    Hyperlipidemia    Sleep apnea     Past Surgical History:   Procedure Laterality Date    Colonoscopy      Hernia surgery      left inguinal hernia repair x2    Inguinal hernia repair Right 04/12/16    With insertion of an extra large PreFix Marlex plug and mesh and excision of lipoma of the cord.    Other surgical history Right     HAND SURGERY    Tonsillectomy      Umbilical hernia repair  04/12/16    with mesh     Social History     Socioeconomic History    Marital status:      Spouse name: Not on file    Number of children: 3    Years of education: Not on file    Highest education level: Not on file   Occupational History    Occupation: retired   Tobacco Use    Smoking status: Former    Smokeless tobacco: Never   Substance and Sexual Activity    Alcohol use: Yes     Alcohol/week: 2.0 standard drinks of alcohol     Types: 2 Cans of beer per week     Comment: socially    Drug use: No    Sexual activity: Not on file   Other Topics Concern    Grew up on a farm No    History of tanning Yes    Outdoor occupation No    Pt has a pacemaker No    Pt has a defibrillator No    Reaction to local anesthetic No   Social History Narrative    Not on file     Social Drivers of Health     Financial Resource Strain: Low Risk  (4/11/2024)    Received from [facility]    Overall Financial Resource Strain (CARDIA)     Difficulty of Paying Living Expenses: Not hard at all   Food Insecurity: No Food Insecurity (4/11/2024)    Received from [facility]    Hunger Vital Sign     Worried About Running Out of Food in  the Last Year: Never true     Ran Out of Food in the Last Year: Never true   Transportation Needs: No Transportation Needs (4/11/2024)    Received from [facility]    PRAPARE - Transportation     Lack of Transportation (Medical): No     Lack of Transportation (Non-Medical): No   Physical Activity: Not on file   Stress: Not on file   Social Connections: Not on file   Housing Stability: Unknown (4/11/2024)    Received from [facility]    Housing Stability Vital Sign     Unable to Pay for Housing in the Last Year: No     Number of Places Lived in the Last Year: Not on file     Unstable Housing in the Last Year: No     Family History   Problem Relation Age of Onset    Cancer Father         Skin    Prostate Cancer Father     Cancer Mother         Breast    Breast Cancer Mother        There were no vitals filed for this visit.    HPI:    Chief Complaint   Patient presents with    Full Skin Exam     LOV 5/29/24. Pt presents for skin check. Has personal Hx of Ak's. Has a few new dry spots on back of scalp, and on chest. Denies personal Hx of skin cancer. Has questions regarding Rx fluorouracil 5 % External Cream. Pt is unsure about usage. Has family Hx of unspecified skin cancer(Father).       Past notes/ records and appropriate/relevant lab results including pathology and past body maps reviewed. Updated and new information noted in current visit.     History of AK's follow-up.  Patient has been more careful use sunscreen and hat.  Still outdoors a great deal      Patient presents with concerns above.    Patient has been in their usual state of health.  History, medications, allergies reviewed as noted.      ROS:  Denies any other systemic complaints.  No new or changeing lesions other than noted above. No fevers, chills, night sweats, unusual sun sensitivity.  No other skin complaints.        History, medications, allergies reviewed as noted.       Physical Examination:      Well-developed well-nourished patient alert oriented in no acute distress.  Exam total-body performed, including scalp, head, neck, face,nails, hair, external eyes, including conjunctival mucosa, eyelids, lips external ears, back, chest,/ breasts, axillae,  abdomen, arms, legs, palms.     Multiple light to medium brown, well marginated, uniformly pigmented, macules and papules 6 mm and less scattered on exam. pigmented lesions examined with dermoscopy benign-appearing patterns.     Waxy tannish keratotic papules scattered, cherry-red vascular papules scattered.    See map today's date for lesions noted .  Erythematous scaling keratotic papules see below     otherwise remarkable for lesions as noted on map.  See Assessment /Plan for additional history and physical exam also:    Assessment / plan:    No orders of the defined types were placed in this encounter.      Meds & Refills for this Visit:  Requested Prescriptions     Signed Prescriptions Disp Refills    betamethasone dipropionate 0.05 % External Lotion 60 mL 5     Sig: To area of psoriasis on scalp use bid as directed         Encounter Diagnoses   Name Primary?    AK (actinic keratosis) Yes    Psoriasiform dermatitis     Lentigo     Seborrheic keratoses     Multiple nevi     Medication monitoring encounter      Patient seen for follow-up long-term monitoring, treatment of  Actinic damage, actinic keratoses medication monitoring  Plan of care:  ongoing surveillance, monitoring including regular follow-up due to longer term risk of recurrence, new lesions.  See previous notes.  There is a longitudinal care relationship with me, the care plan reflects the ongoing nature of the continuous relationship of care, and the medical record indicates that there is ongoing treatment of a serious/complex medical condition which I am currently managing.  is Applicable      AK's continue fluorouracil as needed cheeks in particular  Will use twice weekly for 6 weeks to  ears mid vertex, anterior scalp temples    Actinic Keratoses.  Precancerous nature discussed. Sun protection, sunscreen/ blocks encouraged .  Monitoring for new lesions.  Sun damage additional recurrent and new actinic keratoses, skin cancers may occur in areas of prior actinic keratoses, related to past sun exposure to minimize current sun exposure.  Sunscreen applied consistently regularly, reapplication and sun protection while driving recommended.    More prominent psoriasiform dermatitis at occipital scalp.  Dapsone twice daily.  Consider additional, alternative agents if not improving    To sacrum has been using mometasone and clotrimazole continue as needed  Psoriasiform dermatitis.Will treat with medications and grid.  Overall skincare, liberal use of emollients discussed.  Consider more aggressive therapy if worsening.Patient will let us know how they are doing over the next several weeks.  Await clinical response to above therapy.  Recheck in 2-3 months if no improvement.    Multiple benign keratoses in the background upper lip, right cheek scattered lesions on the chest  Waxy tan keratotic papules lesions in areas of concern as noted reassurance given.  Benign nature discussed.  Possibility of cryo, alphahydroxy acids over-the-counter retinol's discussed.  Continue careful sun protection  History of verrucoid keratoses stable    Area of chronic pruritus burning better presently stable last year had been more inflamedpossible brachial radial pruritus.  Continue with moisturizers, sun protection    Scattered waxy tan keratotic papules reassurance very benign keratoses no other suspicious lesions noted      please refer to map for specific lesions.  See additional diagnoses.  Pros cons of various therapies, risks benefits discussed.Pathophysiology discussed with patient.  Therapeutic options reviewed.  See  Medications in grid.  Instructions reviewed at length.    Benign nevi, seborrheic  keratoses, cherry  angiomas:  Reassurance regarding other benign skin lesions.Signs and symptoms of skin cancer, ABCDE's of melanoma discussed with patient. Sunscreen use, sun protection, self exams reviewed.  Sunscreen sun protective clothing discussed.  At least SPF 50, hat followup as noted RTC routine checkup 6 mos - one year or p.r.n.      The patient indicates understanding of these issues and agrees to the plan.  The patient is asked to return as noted in follow-up/ above.    This note was generated using Dragon voice recognition software.  Please contact me regarding any confusion resulting from errors in recognition.

## 2025-01-30 ENCOUNTER — PATIENT MESSAGE (OUTPATIENT)
Dept: DERMATOLOGY CLINIC | Facility: CLINIC | Age: 75
End: 2025-01-30

## 2025-01-31 NOTE — TELEPHONE ENCOUNTER
LOV 12/23/24 - Please see question from pt.  Does pt need to come back in or is there something pt can use?  Can pt try the betamethasone lotion or if he has mometasone cream at home, can he use a little bit of this?  Thank you.

## 2025-02-01 RX ORDER — TRIAMCINOLONE ACETONIDE 5 MG/G
OINTMENT TOPICAL
Qty: 30 G | Refills: 0 | Status: SHIPPED | OUTPATIENT
Start: 2025-02-01

## 2025-02-11 ENCOUNTER — TELEPHONE (OUTPATIENT)
Facility: CLINIC | Age: 75
End: 2025-02-11

## 2025-02-11 NOTE — TELEPHONE ENCOUNTER
----- Message from Sharlene LEE sent at 9/24/2015 10:45 AM CDT -----  Regarding: Recall colon   Recall patient for colon in 10 years per GS.

## (undated) NOTE — LETTER
2/11/2025    Baldev Vieyra        [address] [facility] [address]            Dear Baldev Vieyra,      Our records indicate that you are due for an appointment for a Colonoscopy with Colten García MD. Our doctors are booking out about 3-6 months in advance for procedures.     Please call our office to schedule this appointment.  Your medical well-being is important to us.    If your insurance requires a referral, please call your primary care office to request one.      Thank you,      The Physicians and Staff at [facility]

## (undated) NOTE — ED AVS SNAPSHOT
Vesna Norton   MRN: [id number]    Department:  [facility] Emergency Department   Date of Visit:  2/2/2019           Disclosure     Insurance plans vary and the physician(s) referred by the ER may not be covered by your plan.  Please contact yo within the next three months to obtain basic health screening including reassessment of your blood pressure.     IF THERE IS ANY CHANGE OR WORSENING OF YOUR CONDITION, CALL YOUR PRIMARY CARE PHYSICIAN AT ONCE OR RETURN IMMEDIATELY TO THE EMERGENCY DEPARTMEN